# Patient Record
Sex: MALE | Race: WHITE
[De-identification: names, ages, dates, MRNs, and addresses within clinical notes are randomized per-mention and may not be internally consistent; named-entity substitution may affect disease eponyms.]

---

## 2022-07-14 ENCOUNTER — HOSPITAL ENCOUNTER (EMERGENCY)
Dept: HOSPITAL 54 - ER | Age: 57
Discharge: HOME | End: 2022-07-14
Payer: MEDICAID

## 2022-07-14 VITALS — BODY MASS INDEX: 37.93 KG/M2 | HEIGHT: 72 IN | WEIGHT: 280 LBS

## 2022-07-14 VITALS — DIASTOLIC BLOOD PRESSURE: 90 MMHG | SYSTOLIC BLOOD PRESSURE: 145 MMHG

## 2022-07-14 DIAGNOSIS — R03.0: ICD-10-CM

## 2022-07-14 DIAGNOSIS — Z20.822: ICD-10-CM

## 2022-07-14 DIAGNOSIS — R50.9: Primary | ICD-10-CM

## 2022-07-14 PROCEDURE — 99284 EMERGENCY DEPT VISIT MOD MDM: CPT

## 2022-07-14 PROCEDURE — 87426 SARSCOV CORONAVIRUS AG IA: CPT

## 2022-07-14 PROCEDURE — 71045 X-RAY EXAM CHEST 1 VIEW: CPT

## 2022-07-14 PROCEDURE — 87804 INFLUENZA ASSAY W/OPTIC: CPT

## 2022-07-14 PROCEDURE — C9803 HOPD COVID-19 SPEC COLLECT: HCPCS

## 2022-07-14 NOTE — NUR
TO ER BED 7. [id number] FRM HOME C/O FLULIKE SYMPTOMS. PT IS ALERT AND ORIENTED. 
AMBULATORY WITH STEADY GAIT. BREATHING IS EVEN AND NONLABORED. FEVER  
NOTED AT TRIAGE. COVID PRECAUTIONS INPLACE. CONNECTED TO MONITOR. AWAITING MD 
ORDERS

## 2022-07-14 NOTE — NUR
Patient discharged to home in stable condition. RX Written and verbal after 
care instructions given. Patient verbalizes understanding of instruction. Pt 
ambulatory with a steady gait. Called [place] [phone] for pickup